# Patient Record
(demographics unavailable — no encounter records)

---

## 2025-05-09 NOTE — HISTORY OF PRESENT ILLNESS
[Postpartum Follow Up] : postpartum follow up [Delivery Date: ___] : on [unfilled] [Male] : Delivery History: baby boy [Wt. ___] : weighing [unfilled] [Breastfeeding] : currently nursing [Vacuum Assist] : delivered by vaginal delivery using vaccum assist [Back to Normal] : is back to normal in size [Normal] : the vagina was normal [Examination Of The Breasts] : breasts are normal [Doing Well] : is doing well [No Sign of Infection] : is showing no signs of infection [None] : None [FreeTextEntry8] : 6 week PPV. 31 year old  female s/p VAVD on 3/26. Reports doing well. Currently breastfeeding.  [FreeTextEntry9] : anxiety [de-identified] : Delivered by JR. RODRÍGUEZ for abnormal fetal heart rate tracing. [de-identified] : 31 year old  female s/p VAVD on 3/26 presents for 6 week PPV. POP for contraception. Return to normal activities. F/u in 4-6 months for annual.

## 2025-05-09 NOTE — END OF VISIT
[FreeTextEntry3] :  I, Trinity Deng, acted as a scribe on behalf of Dr. Amelia Baker M.D. on 05/09/2025.   All medical entries made by the scribe were at my, Dr. Amelia Baker M.D., direction and personally dictated by me on 05/09/2025. I have reviewed the chart and agree that the record accurately reflects my personal performance of the history, physical exam, assessment and plan. I have also personally directed, reviewed, and agreed with the chart.